# Patient Record
Sex: MALE | Race: NATIVE HAWAIIAN OR OTHER PACIFIC ISLANDER | URBAN - METROPOLITAN AREA
[De-identification: names, ages, dates, MRNs, and addresses within clinical notes are randomized per-mention and may not be internally consistent; named-entity substitution may affect disease eponyms.]

---

## 2022-04-18 ENCOUNTER — OFFICE VISIT (OUTPATIENT)
Dept: URBAN - METROPOLITAN AREA CLINIC 10 | Facility: CLINIC | Age: 52
End: 2022-04-18
Payer: OTHER GOVERNMENT

## 2022-04-18 DIAGNOSIS — E11.9 TYPE 2 DIABETES MELLITUS W/O COMPLICATION: ICD-10-CM

## 2022-04-18 DIAGNOSIS — Z79.84 LONG TERM (CURRENT) USE OF ORAL HYPOGLYCEMIC DRUGS: ICD-10-CM

## 2022-04-18 DIAGNOSIS — H02.831 DERMATOCHALASIS OF RIGHT UPPER EYELID: ICD-10-CM

## 2022-04-18 DIAGNOSIS — H02.834 DERMATOCHALASIS OF LEFT UPPER EYELID: ICD-10-CM

## 2022-04-18 DIAGNOSIS — H25.813 COMBINED FORMS OF AGE-RELATED CATARACT, BILATERAL: ICD-10-CM

## 2022-04-18 PROCEDURE — 92133 CPTRZD OPH DX IMG PST SGM ON: CPT | Performed by: OPTOMETRIST

## 2022-04-18 PROCEDURE — 92134 CPTRZ OPH DX IMG PST SGM RTA: CPT | Performed by: OPTOMETRIST

## 2022-04-18 PROCEDURE — 92250 FUNDUS PHOTOGRAPHY W/I&R: CPT | Performed by: OPTOMETRIST

## 2022-04-18 PROCEDURE — 99204 OFFICE O/P NEW MOD 45 MIN: CPT | Performed by: OPTOMETRIST

## 2022-04-18 RX ORDER — PREDNISOLONE ACETATE 10 MG/ML
1 % SUSPENSION/ DROPS OPHTHALMIC
Qty: 2.5 | Refills: 2 | Status: ACTIVE
Start: 2022-04-18

## 2022-04-18 ASSESSMENT — INTRAOCULAR PRESSURE
OS: 34
OD: 32
OD: 37
OS: 35

## 2022-04-18 ASSESSMENT — VISUAL ACUITY
OD: 20/20
OS: 20/25

## 2022-04-18 NOTE — IMPRESSION/PLAN
Impression: Primary iridocyclitis, bilateral: H20.013. Plan: 1st episode by hx. Mild AC reaction c elevated IOP. Begin pred acetate q1h OU. Begin brimonidine bid OU. Recommend eval c PCP to include CBC c diff, FTA-ABS, RPR, ACE, QFT/PPD, and consider rheumatology consult. RTC Thursday for f/u.

## 2022-04-21 ENCOUNTER — OFFICE VISIT (OUTPATIENT)
Dept: URBAN - METROPOLITAN AREA CLINIC 10 | Facility: CLINIC | Age: 52
End: 2022-04-21
Payer: OTHER GOVERNMENT

## 2022-04-21 DIAGNOSIS — H40.053 OCULAR HYPERTENSION, BILATERAL: ICD-10-CM

## 2022-04-21 DIAGNOSIS — H20.013 PRIMARY IRIDOCYCLITIS, BILATERAL: Primary | ICD-10-CM

## 2022-04-21 PROCEDURE — 99213 OFFICE O/P EST LOW 20 MIN: CPT | Performed by: OPTOMETRIST

## 2022-04-21 ASSESSMENT — INTRAOCULAR PRESSURE
OD: 25
OS: 22

## 2022-04-21 NOTE — IMPRESSION/PLAN
Impression: Primary iridocyclitis, bilateral: H20.013. Plan: Improving OU. 1st episode by hx. Cont pred acetate but reduce qid OU. Stressed importance of compliance. Cont  brimonidine bid OU. 
4/18/22- Recommend eval c PCP to include CBC c diff, FTA-ABS, RPR, ACE, QFT/PPD, and consider rheumatology consult. Pt has blood work scheduled for May 2nd. Notes printed today. RTC in 1 week for f/u and IOP ck.

## 2022-04-28 ENCOUNTER — OFFICE VISIT (OUTPATIENT)
Dept: URBAN - METROPOLITAN AREA CLINIC 10 | Facility: CLINIC | Age: 52
End: 2022-04-28
Payer: OTHER GOVERNMENT

## 2022-04-28 DIAGNOSIS — H40.053 OCULAR HYPERTENSION, BILATERAL: ICD-10-CM

## 2022-04-28 DIAGNOSIS — H20.013 PRIMARY IRIDOCYCLITIS, BILATERAL: Primary | ICD-10-CM

## 2022-04-28 PROCEDURE — 99213 OFFICE O/P EST LOW 20 MIN: CPT | Performed by: OPTOMETRIST

## 2022-04-28 ASSESSMENT — INTRAOCULAR PRESSURE
OS: 24
OD: 24

## 2022-04-28 NOTE — IMPRESSION/PLAN
Impression: Ocular hypertension, bilateral: H40.053. Plan: IOP remains mildly elevated OU. Will taper steroid and then stop brimonidine as above. [employer] OCT: normal OD, normal OS. Discussed may need to restart gtts. for sub-optimal IOP.

## 2022-04-28 NOTE — IMPRESSION/PLAN
Impression: Primary iridocyclitis, bilateral: H20.013. Plan: Resolved. 1st episode by hx. Cont pred acetate but reduce bid x 1 wk then qday x 1 wk then stop. Cont  brimonidine bid OU x 2 weeks then stop. 4/18/22- Recommend eval c PCP to include CBC c diff, FTA-ABS, RPR, ACE, QFT/PPD, and consider rheumatology consult. Pt has blood work scheduled tomorrow. RTC in 1 month for IOP check and f/u.

## 2022-05-31 ENCOUNTER — OFFICE VISIT (OUTPATIENT)
Dept: URBAN - METROPOLITAN AREA CLINIC 10 | Facility: CLINIC | Age: 52
End: 2022-05-31
Payer: OTHER GOVERNMENT

## 2022-05-31 DIAGNOSIS — H40.053 OCULAR HYPERTENSION, BILATERAL: ICD-10-CM

## 2022-05-31 DIAGNOSIS — H20.013 PRIMARY IRIDOCYCLITIS, BILATERAL: Primary | ICD-10-CM

## 2022-05-31 PROCEDURE — 99213 OFFICE O/P EST LOW 20 MIN: CPT | Performed by: OPTOMETRIST

## 2022-05-31 RX ORDER — PREDNISOLONE ACETATE 10 MG/ML
1 % SUSPENSION/ DROPS OPHTHALMIC
Qty: 2.5 | Refills: 2 | Status: ACTIVE
Start: 2022-05-31

## 2022-05-31 ASSESSMENT — INTRAOCULAR PRESSURE
OS: 22
OD: 22

## 2022-05-31 NOTE — IMPRESSION/PLAN
Impression: Primary iridocyclitis, bilateral: H20.013. Plan: No AC reaction but injection worse again, suspicious for anterior scleritis. Restart pred acetate qid OU. Pt. did eval c PCP for CBC c diff, FTA-ABS, RPR, ACE, QFT/PPD and reports unremarkable findings. Recommend evaluation c rheumatology as well. RTC 2-3 weeks for f/u. Will also consult cornea.

## 2022-05-31 NOTE — IMPRESSION/PLAN
Impression: Ocular hypertension, bilateral: H40.053. Plan: IOP remains mildly elevated OU off brimonidine. [employer] OCT: normal OD, normal OS. Discussed may need to restart gtts. for sub-optimal IOP. Possible steroid responder.